# Patient Record
Sex: FEMALE | Race: OTHER
[De-identification: names, ages, dates, MRNs, and addresses within clinical notes are randomized per-mention and may not be internally consistent; named-entity substitution may affect disease eponyms.]

---

## 2020-10-07 ENCOUNTER — HOSPITAL ENCOUNTER (EMERGENCY)
Dept: HOSPITAL 8 - ED | Age: 55
Discharge: HOME | End: 2020-10-07
Payer: COMMERCIAL

## 2020-10-07 VITALS — WEIGHT: 141.98 LBS | HEIGHT: 59 IN | BODY MASS INDEX: 28.62 KG/M2

## 2020-10-07 VITALS — DIASTOLIC BLOOD PRESSURE: 20 MMHG | SYSTOLIC BLOOD PRESSURE: 118 MMHG

## 2020-10-07 DIAGNOSIS — E11.9: ICD-10-CM

## 2020-10-07 DIAGNOSIS — R00.0: ICD-10-CM

## 2020-10-07 DIAGNOSIS — N10: Primary | ICD-10-CM

## 2020-10-07 LAB
<PLATELET ESTIMATE>: (no result)
<PLT MORPHOLOGY>: (no result)
ALBUMIN SERPL-MCNC: 3.5 G/DL (ref 3.4–5)
ALP SERPL-CCNC: 78 U/L (ref 45–117)
ALT SERPL-CCNC: 40 U/L (ref 12–78)
ANION GAP SERPL CALC-SCNC: 9 MMOL/L (ref 5–15)
BAND#(MANUAL): 1.62 X10^3/UL
BASOPHILS NFR BLD MANUAL: 1 % (ref 0–1)
BASOS#(MANUAL): 0.15 X10^3/UL (ref 0–0.1)
BILIRUB SERPL-MCNC: 0.5 MG/DL (ref 0.2–1)
CALCIUM SERPL-MCNC: 8.8 MG/DL (ref 8.5–10.1)
CHLORIDE SERPL-SCNC: 105 MMOL/L (ref 98–107)
CREAT SERPL-MCNC: 0.85 MG/DL (ref 0.55–1.02)
EOS#(MANUAL): 0.15 X10^3/UL (ref 0–0.4)
EOS% (MANUAL): 1 % (ref 1–7)
ERYTHROCYTE [DISTWIDTH] IN BLOOD BY AUTOMATED COUNT: 12.8 % (ref 9.6–15.2)
HYPOCHROMIA BLD QL SMEAR: (no result)
LYMPH#(MANUAL): 3.38 X10^3/UL (ref 1–3.4)
LYMPHS% (MANUAL): 23 % (ref 22–44)
MCH RBC QN AUTO: 30.1 PG (ref 27–34.8)
MCHC RBC AUTO-ENTMCNC: 34.7 G/DL (ref 32.4–35.8)
MD: YES
MICROSCOPIC: (no result)
MONOS#(MANUAL): 1.47 X10^3/UL (ref 0.3–2.7)
MONOS% (MANUAL): 10 % (ref 2–9)
NEUTS BAND NFR BLD: 11 % (ref 0–7)
PLATELET # BLD AUTO: 437 X10^3/UL (ref 130–400)
PMV BLD AUTO: 9 FL (ref 7.4–10.4)
PROT SERPL-MCNC: 7.7 G/DL (ref 6.4–8.2)
RBC # BLD AUTO: 5.15 X10^6/UL (ref 3.82–5.3)
SEG#(MANUAL): 7.94 X10^3/UL (ref 1.8–6.8)
SEGS% (MANUAL): 54 % (ref 42–75)

## 2020-10-07 PROCEDURE — 80053 COMPREHEN METABOLIC PANEL: CPT

## 2020-10-07 PROCEDURE — 96375 TX/PRO/DX INJ NEW DRUG ADDON: CPT

## 2020-10-07 PROCEDURE — 87086 URINE CULTURE/COLONY COUNT: CPT

## 2020-10-07 PROCEDURE — 36415 COLL VENOUS BLD VENIPUNCTURE: CPT

## 2020-10-07 PROCEDURE — 87186 SC STD MICRODIL/AGAR DIL: CPT

## 2020-10-07 PROCEDURE — 87077 CULTURE AEROBIC IDENTIFY: CPT

## 2020-10-07 PROCEDURE — 96365 THER/PROPH/DIAG IV INF INIT: CPT

## 2020-10-07 PROCEDURE — 74177 CT ABD & PELVIS W/CONTRAST: CPT

## 2020-10-07 PROCEDURE — 99285 EMERGENCY DEPT VISIT HI MDM: CPT

## 2020-10-07 PROCEDURE — 85025 COMPLETE CBC W/AUTO DIFF WBC: CPT

## 2020-10-07 PROCEDURE — 96376 TX/PRO/DX INJ SAME DRUG ADON: CPT

## 2020-10-07 PROCEDURE — 84703 CHORIONIC GONADOTROPIN ASSAY: CPT

## 2020-10-07 PROCEDURE — 81001 URINALYSIS AUTO W/SCOPE: CPT

## 2020-10-07 RX ADMIN — MORPHINE SULFATE PRN MG: 4 INJECTION INTRAVENOUS at 19:41

## 2020-10-07 RX ADMIN — MORPHINE SULFATE PRN MG: 4 INJECTION INTRAVENOUS at 21:52

## 2020-10-07 NOTE — NUR
PT STATES SHE FEELS MUCH BETTER NOW, FLANK PAIN/BACK PAIN RESOLVED. D/C 
INSTRUCTIONS, MEDS & F/U APPT RV'WD WITH PT, SHE VERBALIZES UNDERSTANDING. RX 
GIVEN X2. PT AMBULATED OUT OF ED WITH FRIEND WITHOUT DIFFICULTY.

## 2020-10-07 NOTE — NUR
PT REPORTS IMPROVEMENT IN L FLANK PAIN AFTER PAIN MEDS. VSS. RV'WD POC, SHE 
VERBALIZES UNDERSTANDING. CHART UP FOR RECHECK.

## 2020-10-07 NOTE — NUR
PT C/O INCREASED FLANK/BACK PAIN AGAIN 7/10. MEDICATED WITH SECOND DOSE 
MORPHINE PER PRN ORDERS. ABX INFUSING. PT UNDERSTANDS POC, STATES HER  
WILL COME TO PICK HER UP. FRIEND AT BS.